# Patient Record
Sex: FEMALE | Race: ASIAN | NOT HISPANIC OR LATINO | URBAN - METROPOLITAN AREA
[De-identification: names, ages, dates, MRNs, and addresses within clinical notes are randomized per-mention and may not be internally consistent; named-entity substitution may affect disease eponyms.]

---

## 2017-04-05 ENCOUNTER — EMERGENCY (EMERGENCY)
Facility: HOSPITAL | Age: 27
LOS: 1 days | Discharge: HOME | End: 2017-04-05
Attending: EMERGENCY MEDICINE | Admitting: EMERGENCY MEDICINE
Payer: COMMERCIAL

## 2017-04-05 VITALS
DIASTOLIC BLOOD PRESSURE: 86 MMHG | TEMPERATURE: 99 F | OXYGEN SATURATION: 96 % | SYSTOLIC BLOOD PRESSURE: 123 MMHG | HEART RATE: 96 BPM | RESPIRATION RATE: 16 BRPM | WEIGHT: 139.99 LBS

## 2017-04-05 VITALS
OXYGEN SATURATION: 99 % | SYSTOLIC BLOOD PRESSURE: 106 MMHG | RESPIRATION RATE: 16 BRPM | TEMPERATURE: 99 F | HEART RATE: 95 BPM | DIASTOLIC BLOOD PRESSURE: 72 MMHG

## 2017-04-05 DIAGNOSIS — L02.416 CUTANEOUS ABSCESS OF LEFT LOWER LIMB: ICD-10-CM

## 2017-04-05 PROCEDURE — 99283 EMERGENCY DEPT VISIT LOW MDM: CPT | Mod: 25

## 2017-04-05 PROCEDURE — 10060 I&D ABSCESS SIMPLE/SINGLE: CPT

## 2017-04-05 PROCEDURE — 87186 SC STD MICRODIL/AGAR DIL: CPT

## 2017-04-05 PROCEDURE — 87070 CULTURE OTHR SPECIMN AEROBIC: CPT

## 2017-04-05 RX ORDER — AZTREONAM 2 G
1 VIAL (EA) INJECTION
Qty: 20 | Refills: 0
Start: 2017-04-05 | End: 2017-04-15

## 2017-04-05 RX ADMIN — Medication 1 TABLET(S): at 14:58

## 2017-04-05 NOTE — ED PROVIDER NOTE - OBJECTIVE STATEMENT
27yo f with no significant PMH presents to ED with L labial boil. Pt noticed a "bump" in the area starting on Sunday, which then progressed to becoming red on Monday. Pain was increasingly getting worse, especially with movement. Tender upon palpation. Pt took OTC pain relievers which helped some, but did not completely resolve her pain symptoms. Pt went to urgent care today and was then directed to come to the ED for I&D. 27yo f with no significant PMH presents to ED with L vaginal labia boil. Pt noticed a "bump" in the area starting on Sunday, which then progressed to becoming red on Monday. Pain was increasingly getting worse, especially with movement. Tender upon palpation. Pt took OTC pain relievers which helped some, but did not completely resolve her pain symptoms. Pt went to urgent care today and was then directed to come to the ED for I&D.

## 2017-04-05 NOTE — ED PROVIDER NOTE - ATTENDING CONTRIBUTION TO CARE
I have personally performed a face to face diagnostic evaluation on this patient.  I have reviewed the PA note and agree with the history, exam, and plan of care, except as noted.  History and Exam by me shows 26 female with left medial thigh abscess 3cm proximal to labia majora, not involving vagina or rectum, needs incision and drainage, start bactrim.

## 2017-04-05 NOTE — ED ADULT NURSE NOTE - OBJECTIVE STATEMENT
pt has abbesses to the left groin, vaginal area since Monday. redness, swelling and pain on palpation noted. denies chills, fever, urinary and bowel symptoms.

## 2017-04-06 ENCOUNTER — EMERGENCY (EMERGENCY)
Facility: HOSPITAL | Age: 27
LOS: 1 days | Discharge: HOME | End: 2017-04-06
Attending: EMERGENCY MEDICINE | Admitting: EMERGENCY MEDICINE
Payer: COMMERCIAL

## 2017-04-06 VITALS
WEIGHT: 139.99 LBS | TEMPERATURE: 99 F | SYSTOLIC BLOOD PRESSURE: 110 MMHG | RESPIRATION RATE: 14 BRPM | OXYGEN SATURATION: 98 % | HEART RATE: 83 BPM | DIASTOLIC BLOOD PRESSURE: 78 MMHG | HEIGHT: 62 IN

## 2017-04-06 DIAGNOSIS — Z48.01 ENCOUNTER FOR CHANGE OR REMOVAL OF SURGICAL WOUND DRESSING: ICD-10-CM

## 2017-04-06 PROCEDURE — G0463: CPT

## 2017-04-06 NOTE — ED ADULT NURSE NOTE - OBJECTIVE STATEMENT
[place] & packed yesterday & was instructed to come back today for check [place] & packed yesterday & was instructed to come back today for check Pt seen by Dr Gray packing removed dressing applied Pt d/c'd

## 2017-04-07 LAB
-  AMIKACIN: SIGNIFICANT CHANGE UP
-  AZTREONAM: SIGNIFICANT CHANGE UP
-  CEFEPIME: SIGNIFICANT CHANGE UP
-  CEFTAZIDIME: SIGNIFICANT CHANGE UP
-  CIPROFLOXACIN: SIGNIFICANT CHANGE UP
-  GENTAMICIN: SIGNIFICANT CHANGE UP
-  IMIPENEM: SIGNIFICANT CHANGE UP
-  LEVOFLOXACIN: SIGNIFICANT CHANGE UP
-  MEROPENEM: SIGNIFICANT CHANGE UP
-  PIPERACILLIN/TAZOBACTAM: SIGNIFICANT CHANGE UP
-  TOBRAMYCIN: SIGNIFICANT CHANGE UP
CULTURE RESULTS: SIGNIFICANT CHANGE UP
METHOD TYPE: SIGNIFICANT CHANGE UP
ORGANISM # SPEC MICROSCOPIC CNT: SIGNIFICANT CHANGE UP
ORGANISM # SPEC MICROSCOPIC CNT: SIGNIFICANT CHANGE UP
SPECIMEN SOURCE: SIGNIFICANT CHANGE UP

## 2017-05-08 NOTE — ED PROVIDER NOTE - CROS ED HEME ALL NEG
HPI     Concerns About Ocular Health    Additional comments: Eye exam and refraction.  Followed by Dr. Sykes   for bilateral ocular hypertension.   Last visit with Dr. Sykes was on   03/06/2017.  DFE done at that visit.            Comments   Patient's age: 62 y.o.  Occupation: Housewife  Approximate date of last eye examination:  05/02/2016. 03/06/2017  Name of last eye doctor seen: Dr Aquino, Dr. Sykes   City/State: [facility]  Wears glasses? Yes     If yes, wears  Full-time or part-time?  Full-time  Present glasses are: Bifocal, SV Distance, SV Reading?  Progressive lenses  Approximate age of present glasses:  2+ years old   Got new glasses following last exam, or subsequently?:  No   Any problem with VA with glasses?  No  Wears CLs?:  No  Headaches?  No  Eye pain/discomfort?  No                                                                                     Flashes?  No  Floaters?  No  Diplopia/Double vision?  No  Patient's Ocular History:         Any eye surgeries? No         Any eye injury?  No         Any treatment for eye disease?  Ocular Hypertension   Family history of eye disease?    Maternal Uncle + Glaucoma  Significant patient medical history:         1. Diabetes?  Yes, diagnosed 7/2012 - Controlled by diet  LBS - 112 this am  ..Hemoglobin A1C       Date                     Value               Ref Range             Status                03/07/2017               6.3 (H)             4.5 - 6.2 %           Final              Comment:    According to ADA guidelines, hemoglobin A1C <7.0% represents  optimal   control in non-pregnant diabetic patients.  Different  metrics may apply   to specific populations.   Standards of Medical Care in Diabetes -   2016.  For the purpose of screening for the presence of diabetes:  <5.7%       Consistent with the absence of diabetes  5.7-6.4%  Consistent with   increasing risk for diabetes   (prediabetes)  >or=6.5%  Consistent with   diabetes  Currently no  consensus exists for use of hemoglobin A1C  for   diagnosis of diabetes for children.         11/03/2016               7.4 (H)             4.5 - 6.2 %           Final              Comment:    According to ADA guidelines, hemoglobin A1C <7.0% represents  optimal   control in non-pregnant diabetic patients.  Different  metrics may apply   to specific populations.   Standards of Medical Care in Diabetes -   2016.  For the purpose of screening for the presence of diabetes:  <5.7%       Consistent with the absence of diabetes  5.7-6.4%  Consistent with   increasing risk for diabetes   (prediabetes)  >or=6.5%  Consistent with   diabetes  Currently no consensus exists for use of hemoglobin A1C  for   diagnosis of diabetes for children.         06/02/2016               7.1 (H)             4.5 - 6.2 %           Final                ----------         If yes, IDDM or NIDDM? NIDDM   2. HBP?  Yes, controlled by medication and diet              3. Other (describe):     ! OTC eyedrops currently using:  None   ! Prescription eye meds currently using:                                 Latanoprost QHS OU                               Timolol 0.5% Gel-forming solution QAM OU   ! Any history of allergy/adverse reaction to any eye meds used   previously?  No    ! Any history of allergy/adverse reaction to eyedrops used during prior   eye exam(s)? No    ! Any history of allergy/adverse reaction to Novacaine or similar meds?   No   ! Any history of allergy/adverse reaction to Epinephrine or similar meds?   No    ! Patient okay with use of anesthetic eyedrops to check eye pressure?    Yes        ! Patient okay with use of eyedrops to dilate pupils today?  Pt would   prefer not to be dilated today   !  Allergies/Medications/Medical History/Family History reviewed today?    Yes                  Blood pressure 125/57  Pulse = 71 BPM          Last edited by Cesario Aquino, OD on 5/8/2017 12:03 PM. (History)            Assessment /Plan      For exam results, see Encounter Report.    1. Nuclear sclerosis, bilateral     2. Ocular hypertension, bilateral     3. Hyperopia with astigmatism and presbyopia, bilateral                  Prior diagnosis of bilateral ocular hypertension  Poor visual field test taker  Prior HVF deemed unusable in both eyes, due to excessive fixation losses, and excessive false positive responses, and excessive false negative responses.   Currently using Latanoprost 0.005% ophthalmic solution in both eyes every evening ( in lieu of Travatan-  Z), and Timolol Maleate 0.5% gel-forming solution every morning in both eyes, for IOP control  Today IOP high-normal in OD, and high-normal/borderline in OS.  Followed by Dr. Sykes.    Early nuclear sclerosis of lens of both eyes, and bilateral peripheral cortical cataract. No need for cataract surgery.    Hyperopia with astigmatism in each eye and presbyopia consistent with age.  New spectacle lens Rx issued for full-time wear.    Continue present drops for IOP control in both eyes, as outlined above.  Follow up with Dr. Sykes as he recommends.  Recheck refraction in one year - or prior if any problems or changes in VA with glasses noted in the interim.                        negative...

## 2020-03-05 ENCOUNTER — OUTPATIENT (OUTPATIENT)
Dept: OUTPATIENT SERVICES | Facility: HOSPITAL | Age: 30
LOS: 1 days | End: 2020-03-05
Payer: COMMERCIAL

## 2020-03-05 VITALS
HEIGHT: 62 IN | RESPIRATION RATE: 16 BRPM | OXYGEN SATURATION: 99 % | TEMPERATURE: 98 F | SYSTOLIC BLOOD PRESSURE: 126 MMHG | WEIGHT: 130.95 LBS | DIASTOLIC BLOOD PRESSURE: 76 MMHG | HEART RATE: 84 BPM

## 2020-03-05 DIAGNOSIS — K80.20 CALCULUS OF GALLBLADDER WITHOUT CHOLECYSTITIS WITHOUT OBSTRUCTION: ICD-10-CM

## 2020-03-05 DIAGNOSIS — Z01.818 ENCOUNTER FOR OTHER PREPROCEDURAL EXAMINATION: ICD-10-CM

## 2020-03-05 DIAGNOSIS — K80.12 CALCULUS OF GALLBLADDER WITH ACUTE AND CHRONIC CHOLECYSTITIS WITHOUT OBSTRUCTION: ICD-10-CM

## 2020-03-05 LAB — HCG SERPL-ACNC: <1 MIU/ML — SIGNIFICANT CHANGE UP

## 2020-03-05 PROCEDURE — 36415 COLL VENOUS BLD VENIPUNCTURE: CPT

## 2020-03-05 PROCEDURE — 86901 BLOOD TYPING SEROLOGIC RH(D): CPT

## 2020-03-05 PROCEDURE — 86900 BLOOD TYPING SEROLOGIC ABO: CPT

## 2020-03-05 PROCEDURE — G0463: CPT

## 2020-03-05 PROCEDURE — 84702 CHORIONIC GONADOTROPIN TEST: CPT

## 2020-03-05 PROCEDURE — 86850 RBC ANTIBODY SCREEN: CPT

## 2020-03-05 NOTE — H&P PST ADULT - NSICDXPROBLEM_GEN_ALL_CORE_FT
PROBLEM DIAGNOSES  Problem: Gall bladder stones  Assessment and Plan:  laparoscopic cholecystectomy on 3/6/20.

## 2020-03-05 NOTE — H&P PST ADULT - HISTORY OF PRESENT ILLNESS
28 y/o healthy female with c/o of abdominal discomfort on and off for a yr. Thought it was indigestion, however few days ago the pain was very severe with nausea, pain radiating to the back,  and lasted for 2-3 days. Seen by Dr Guzmán (GI) sonogram and blood work was done. Diagnosed with gall stones. Referred to surgeon, seen by Dr Wilkinson today, scheduled for laparoscopic cholecystectomy on 3/6/20.

## 2020-03-05 NOTE — H&P PST ADULT - ASSESSMENT
30 y/o healthy female with c/o of abdominal discomfort on and off for a yr. Diagnosed with gall stones. Referred to surgeon, seen by Dr Wilkinson today, scheduled for laparoscopic cholecystectomy on 3/6/20.   PST tod   T&S and HCG done  Pre op CHG and instructions given, other  instructions given for the day of procedure.  Hold OTC supplements. Medications reviewed.  NPO after 11pm/ MN the day before surgery. Patient verbalized understanding.  Medical eval advised.

## 2020-03-06 ENCOUNTER — OUTPATIENT (OUTPATIENT)
Dept: OUTPATIENT SERVICES | Facility: HOSPITAL | Age: 30
LOS: 1 days | End: 2020-03-06
Payer: COMMERCIAL

## 2020-03-06 VITALS
HEART RATE: 80 BPM | TEMPERATURE: 99 F | OXYGEN SATURATION: 100 % | SYSTOLIC BLOOD PRESSURE: 116 MMHG | HEIGHT: 62 IN | WEIGHT: 130.95 LBS | RESPIRATION RATE: 16 BRPM | DIASTOLIC BLOOD PRESSURE: 81 MMHG

## 2020-03-06 VITALS
SYSTOLIC BLOOD PRESSURE: 102 MMHG | HEART RATE: 64 BPM | RESPIRATION RATE: 14 BRPM | OXYGEN SATURATION: 99 % | DIASTOLIC BLOOD PRESSURE: 62 MMHG

## 2020-03-06 DIAGNOSIS — K80.12 CALCULUS OF GALLBLADDER WITH ACUTE AND CHRONIC CHOLECYSTITIS WITHOUT OBSTRUCTION: ICD-10-CM

## 2020-03-06 PROCEDURE — C1889: CPT

## 2020-03-06 PROCEDURE — 47562 LAPAROSCOPIC CHOLECYSTECTOMY: CPT

## 2020-03-06 PROCEDURE — 88304 TISSUE EXAM BY PATHOLOGIST: CPT | Mod: 26

## 2020-03-06 PROCEDURE — 88304 TISSUE EXAM BY PATHOLOGIST: CPT

## 2020-03-06 RX ORDER — OXYCODONE HYDROCHLORIDE 5 MG/1
5 TABLET ORAL ONCE
Refills: 0 | Status: DISCONTINUED | OUTPATIENT
Start: 2020-03-06 | End: 2020-03-06

## 2020-03-06 RX ORDER — SODIUM CHLORIDE 9 MG/ML
1000 INJECTION, SOLUTION INTRAVENOUS
Refills: 0 | Status: DISCONTINUED | OUTPATIENT
Start: 2020-03-06 | End: 2020-03-06

## 2020-03-06 RX ORDER — METOCLOPRAMIDE HCL 10 MG
5 TABLET ORAL ONCE
Refills: 0 | Status: COMPLETED | OUTPATIENT
Start: 2020-03-06 | End: 2020-03-06

## 2020-03-06 RX ORDER — CEFAZOLIN SODIUM 1 G
2000 VIAL (EA) INJECTION ONCE
Refills: 0 | Status: COMPLETED | OUTPATIENT
Start: 2020-03-06 | End: 2020-03-06

## 2020-03-06 RX ORDER — HYDROMORPHONE HYDROCHLORIDE 2 MG/ML
0.5 INJECTION INTRAMUSCULAR; INTRAVENOUS; SUBCUTANEOUS
Refills: 0 | Status: DISCONTINUED | OUTPATIENT
Start: 2020-03-06 | End: 2020-03-06

## 2020-03-06 RX ORDER — IBUPROFEN 200 MG
1 TABLET ORAL
Qty: 30 | Refills: 0
Start: 2020-03-06

## 2020-03-06 RX ADMIN — HYDROMORPHONE HYDROCHLORIDE 0.5 MILLIGRAM(S): 2 INJECTION INTRAMUSCULAR; INTRAVENOUS; SUBCUTANEOUS at 16:37

## 2020-03-06 RX ADMIN — HYDROMORPHONE HYDROCHLORIDE 0.5 MILLIGRAM(S): 2 INJECTION INTRAMUSCULAR; INTRAVENOUS; SUBCUTANEOUS at 16:47

## 2020-03-06 RX ADMIN — Medication 5 MILLIGRAM(S): at 17:59

## 2020-03-06 NOTE — BRIEF OPERATIVE NOTE - NSICDXBRIEFPREOP_GEN_ALL_CORE_FT
PRE-OP DIAGNOSIS:  Cholelithiasis with acute on chronic cholecystitis 06-Mar-2020 15:43:10  Gagan Wilkinson

## 2020-03-06 NOTE — ASU DISCHARGE PLAN (ADULT/PEDIATRIC) - CARE PROVIDER_API CALL
Gagan Wilkinson (MD)  Surgery  [address], [address]  [place] [address]  Phone: [phone]  Fax: [phone]  Follow Up Time:

## 2020-03-06 NOTE — BRIEF OPERATIVE NOTE - NSICDXBRIEFPOSTOP_GEN_ALL_CORE_FT
POST-OP DIAGNOSIS:  Cholelithiasis with acute on chronic cholecystitis 06-Mar-2020 15:43:15  Gagan Wilkinson

## 2020-03-06 NOTE — ASU DISCHARGE PLAN (ADULT/PEDIATRIC) - ASU DC SPECIAL INSTRUCTIONSFT
Keep incisions clean, dry and intact x 24 hrs. Apply water proof ice pack 20 mins on, 20 mins off to help decrease pain and swelling. After 24 hrs, you may begin showering as usual but do no scrub or soak incision sites. Pat dry. No tub baths. No swimming pools. No hot tubs.    Call Dr. Wilkinson's office for an appointment for follow up in 1 week

## 2020-03-06 NOTE — ASU PREOP CHECKLIST - AS BP NONINV METHOD
electronic received er bed for eval per pt ekg changes pt states sob x several months upon exertion denies chest pain or fever /chills lungs clear b/l denies cough no sob at present

## 2020-06-02 NOTE — ED ADULT TRIAGE NOTE - WEIGHT IN LBS
139.9 Advancement-Rotation Flap Text: The defect edges were debeveled with a #15 scalpel blade.  Given the location of the defect, shape of the defect and the proximity to free margins an advancement-rotation flap was deemed most appropriate.  Using a sterile surgical marker, an appropriate flap was drawn incorporating the defect and placing the expected incisions within the relaxed skin tension lines where possible. The area thus outlined was incised deep to adipose tissue with a #15 scalpel blade.  The skin margins were undermined to an appropriate distance in all directions utilizing iris scissors.

## 2021-09-23 NOTE — ED ADULT NURSE NOTE - MUSCULOSKELETAL WDL
[de-identified] : still using vape and uninterested in quitting at this time
Full range of motion of upper and lower extremities, no joint tenderness/swelling.

## 2021-09-25 NOTE — ED ADULT NURSE NOTE - SKIN TEMPERATURE MOISTURE
Anesthesia Evaluation     Patient summary reviewed and Nursing notes reviewed   NPO Solid Status: > 8 hours  NPO Liquid Status: > 8 hours           Airway   Mallampati: I  TM distance: >3 FB  Neck ROM: full  No difficulty expected  Dental    (+) edentulous    Pulmonary    (+) wheezes,   Cardiovascular   Exercise tolerance: poor (<4 METS)    Rhythm: regular  Rate: normal        Neuro/Psych  GI/Hepatic/Renal/Endo      Musculoskeletal     Abdominal    Substance History      OB/GYN          Other            Phys Exam Other: Coarse exp wheeze                Anesthesia Plan    ASA 3     general     intravenous induction     Anesthetic plan, all risks, benefits, and alternatives have been provided, discussed and informed consent has been obtained with: patient.       Chest pain warm

## 2024-04-22 NOTE — ED PROCEDURE NOTE - PROCEDURE SUPERVISED BY
[de-identified] : f/u re cortney and airway obstruction w t and a hypertrophy school c/o pt cannot hear rx for acute om 2 weeks ago w primary using fluticasone    Pt w mother frequent uri and cough co enlarged tonsils strep one episode last year co loud snoring, no observed apnea hx otitis media every 2-3 weeks question of hearing loss Dr. Cadet

## 2024-08-09 NOTE — ED ADULT NURSE NOTE - PRIMARY CARE PROVIDER
Patient is a 77y old  Female who presents with a chief complaint of     HPI:  77yF pmhx of HTN, HLD, CAD s/p CABG (3v 11/2020) hx of PE, hyperthyroidism comes to the ED for abnormal lab result. Pt was called by her PCP on Sunday to get repeat blood work after a mildly low potassium level. Today patient received a call that potassium level was critical low and to go to ED. Pt seen and examine at bedside, states she has palpitations, and may on exertion that is not new and out of the ordinary for her baseline. Pt denies any other symptoms or complaints at this time. Outpatient cardiology is Dr. Mitali Shaw     PAST MEDICAL & SURGICAL HISTORY:  H/O: Hypertension      Functional Murmur  childhood polio and rheumatic fever      Single Renal Cyst  right 10cm (per report)      History of Ovarian Cyst  left      Hernia, Umbilical  surgically repaired 2009 during colon resection 2009 amd again repaired in 2010 with mesh      Thyroid Nodule  benign since 08      Colon Cancer  s/p colon rection 3/25/09 no recurrence to date, did not require chemo nor radiation      Former smoker  during teenage years      S/P [facility] Biopsy of Cervix  due to abnormal PAP 1994 and 2008      S/P Tubal Ligation      S/P Colon Resection      S/P Endoscopy      S/P Colonoscopy with Polypectomy      Right Facial Numbness  s/p repair of right facial muscle 30 yrs ago                 MEDICATIONS  (STANDING):    MEDICATIONS  (PRN):      FAMILY HISTORY:  CAD (coronary artery disease) (Father, Mother)      Denies Family history of CAD or early MI    ROS:  Constitutional: denies fever, chills  HEENT: denies blurry vision, difficulty hearing  Respiratory: denies SOB, MAY, cough  Cardiovascular: denies CP,  + palpitations, denies orthopnea, PND, LE edema  Gastrointestinal: denies nausea, vomiting, abdominal pain  Genitourinary: denies urinary changes  Skin: Denies rashes, itching  Neurologic: denies headache, weakness, dizziness  Hematology/Oncology: denies bleeding, easy bruising  ROS negative except as noted above      SOCIAL HISTORY:    No tobacco, Alcohol or Drug use    Vital Signs Last 24 Hrs  T(C): 36.9 (09 Aug 2024 12:23), Max: 37.9 (09 Aug 2024 09:53)  T(F): 98.4 (09 Aug 2024 12:23), Max: 100.2 (09 Aug 2024 09:53)  HR: 54 (09 Aug 2024 12:23) (54 - 63)  BP: 106/53 (09 Aug 2024 12:23) (106/53 - 115/60)  RR: 16 (09 Aug 2024 09:53) (16 - 16)  SpO2: 96% (09 Aug 2024 09:53) (96% - 96%)        Physical Exam:  General: Well developed, well nourished, NAD  HEENT: NCAT, PERRLA, EOMI bl, moist mucous membranes   Neck: Supple, nontender, no mass  Neurology: A&Ox3, nonfocal, sensation intact   Respiratory: CTA B/L, No W/R/R  CV: RRR, +S1/S2, no murmurs, rubs or gallops  Abdominal: Soft, NT, ND +BSx4, no palpable masses  Extremities: No C/C/E, + peripheral pulses  MSK: Normal ROM, no joint erythema or warmth, no joint swelling   Heme: No obvious ecchymosis or petechiae   Skin: warm, dry, normal color      ECG:  Sinus jermain with short OK, unchanged from historical    I&O's Detail      LABS:                        11.2   6.33  )-----------( 208      ( 09 Aug 2024 11:19 )             36.4     08-09    144  |  109<H>  |  20  ----------------------------<  94  3.7   |  30  |  0.69    Ca    9.0      09 Aug 2024 11:19  Mg     2.3     08-09    TPro  6.5  /  Alb  3.5  /  TBili  0.7  /  DBili  x   /  AST  12<L>  /  ALT  18  /  AlkPhos  65  08-09        PT/INR - ( 09 Aug 2024 11:19 )   PT: 11.5 sec;   INR: 1.01 ratio         PTT - ( 09 Aug 2024 11:19 )  PTT:28.4 sec  Urinalysis Basic - ( 09 Aug 2024 11:19 )    Color: x / Appearance: x / SG: x / pH: x  Gluc: 94 mg/dL / Ketone: x  / Bili: x / Urobili: x   Blood: x / Protein: x / Nitrite: x   Leuk Esterase: x / RBC: x / WBC x   Sq Epi: x / Non Sq Epi: x / Bacteria: x      I&O's Summary    BNP  RADIOLOGY & ADDITIONAL STUDIES:
None